# Patient Record
Sex: FEMALE | Race: WHITE | NOT HISPANIC OR LATINO | Employment: FULL TIME | URBAN - METROPOLITAN AREA
[De-identification: names, ages, dates, MRNs, and addresses within clinical notes are randomized per-mention and may not be internally consistent; named-entity substitution may affect disease eponyms.]

---

## 2023-02-27 PROBLEM — U09.9 POST COVID-19 CONDITION, UNSPECIFIED: Status: ACTIVE | Noted: 2023-02-27

## 2023-02-27 PROBLEM — G93.32 COVID-19 LONG HAULER MANIFESTING CHRONIC FATIGUE: Status: ACTIVE | Noted: 2023-02-27

## 2023-02-27 PROBLEM — U09.9 COVID-19 LONG HAULER MANIFESTING CHRONIC FATIGUE: Status: ACTIVE | Noted: 2023-02-27

## 2023-02-27 PROBLEM — J45.909 AIRWAY HYPERREACTIVITY (HHS-HCC): Status: ACTIVE | Noted: 2023-02-27

## 2023-02-27 PROBLEM — H92.09 OTALGIA: Status: ACTIVE | Noted: 2023-02-27

## 2023-02-27 PROBLEM — U07.1 COVID-19: Status: ACTIVE | Noted: 2023-02-27

## 2023-02-27 PROBLEM — R63.5 WEIGHT GAIN: Status: ACTIVE | Noted: 2023-02-27

## 2023-02-27 PROBLEM — L50.9 URTICARIA, UNSPECIFIED: Status: ACTIVE | Noted: 2023-02-27

## 2023-02-27 PROBLEM — F41.9 ANXIETY: Status: ACTIVE | Noted: 2023-02-27

## 2023-02-27 PROBLEM — G43.909 MIGRAINE WITHOUT STATUS MIGRAINOSUS, NOT INTRACTABLE: Status: ACTIVE | Noted: 2023-02-27

## 2023-02-27 PROBLEM — J32.9 SINUSITIS: Status: ACTIVE | Noted: 2023-02-27

## 2023-02-27 RX ORDER — DOXYCYCLINE 100 MG/1
1 TABLET ORAL EVERY 12 HOURS
COMMUNITY
End: 2023-10-23 | Stop reason: ALTCHOICE

## 2023-02-27 RX ORDER — ALBUTEROL SULFATE 90 UG/1
1-2 AEROSOL, METERED RESPIRATORY (INHALATION)
COMMUNITY
Start: 2018-12-17

## 2023-02-27 RX ORDER — CITALOPRAM 20 MG/1
1 TABLET, FILM COATED ORAL DAILY
COMMUNITY
Start: 2022-07-11 | End: 2023-04-07 | Stop reason: ALTCHOICE

## 2023-03-07 PROBLEM — Z00.00 ROUTINE HEALTH MAINTENANCE: Status: ACTIVE | Noted: 2023-03-07

## 2023-03-08 ENCOUNTER — OFFICE VISIT (OUTPATIENT)
Dept: PRIMARY CARE | Facility: CLINIC | Age: 23
End: 2023-03-08
Payer: COMMERCIAL

## 2023-03-08 ENCOUNTER — LAB (OUTPATIENT)
Dept: LAB | Facility: LAB | Age: 23
End: 2023-03-08
Payer: COMMERCIAL

## 2023-03-08 VITALS
SYSTOLIC BLOOD PRESSURE: 121 MMHG | HEART RATE: 64 BPM | WEIGHT: 194.6 LBS | OXYGEN SATURATION: 99 % | DIASTOLIC BLOOD PRESSURE: 79 MMHG | BODY MASS INDEX: 29.59 KG/M2 | TEMPERATURE: 97.3 F

## 2023-03-08 DIAGNOSIS — R63.5 WEIGHT GAIN: Primary | ICD-10-CM

## 2023-03-08 DIAGNOSIS — R63.5 WEIGHT GAIN: ICD-10-CM

## 2023-03-08 DIAGNOSIS — F41.9 ANXIETY: ICD-10-CM

## 2023-03-08 PROBLEM — H92.09 OTALGIA: Status: RESOLVED | Noted: 2023-02-27 | Resolved: 2023-03-08

## 2023-03-08 LAB — THYROTROPIN (MIU/L) IN SER/PLAS BY DETECTION LIMIT <= 0.05 MIU/L: 1.14 MIU/L (ref 0.44–3.98)

## 2023-03-08 PROCEDURE — 1036F TOBACCO NON-USER: CPT | Performed by: NURSE PRACTITIONER

## 2023-03-08 PROCEDURE — 36415 COLL VENOUS BLD VENIPUNCTURE: CPT

## 2023-03-08 PROCEDURE — 84443 ASSAY THYROID STIM HORMONE: CPT

## 2023-03-08 PROCEDURE — 99214 OFFICE O/P EST MOD 30 MIN: CPT | Performed by: NURSE PRACTITIONER

## 2023-03-08 RX ORDER — BUSPIRONE HYDROCHLORIDE 5 MG/1
5 TABLET ORAL 2 TIMES DAILY
Status: DISCONTINUED | OUTPATIENT
Start: 2023-03-08 | End: 2023-03-13

## 2023-03-08 NOTE — ASSESSMENT & PLAN NOTE
Reviewed PCP and endo notes  Reviewed 10/2022 labs in [facility]  - recheck elevated TSH, NL FT4  IUD removed Dec 2022  Will try switching celexa to buspar   - tells me no depression hx  Monitor closely with 30 day fu

## 2023-03-08 NOTE — PROGRESS NOTES
Subjective   Patient ID: Jolanta Andrews is a 22 y.o. female who presents for weight gain.  HPI  Has seen PCP for this  Endo also   - dietician saw her at this visit  - got no further suggestions     B: avacado toast on ezek bread, two eggs and chicken sausage  L: salad with chicken  D: ranch chicken with broccoli  S: yogurt, apple, protein shake    IUD removed in Dec  Last 3 months 4-5 days later than usual       Reviewed 10/22 labs found in [facility]  Also reviewed Dr Davis and bree previous notes      Review of Systems   All other systems reviewed and are negative.        Objective   Physical Exam  Constitutional:       General: She is not in acute distress.  HENT:      Head: Normocephalic and atraumatic.      Right Ear: Tympanic membrane and external ear normal.      Left Ear: Tympanic membrane and external ear normal.      Nose: Nose normal.      Mouth/Throat:      Mouth: Mucous membranes are moist.   Eyes:      Extraocular Movements: Extraocular movements intact.      Conjunctiva/sclera: Conjunctivae normal.   Cardiovascular:      Rate and Rhythm: Normal rate and regular rhythm.      Pulses: Normal pulses.   Pulmonary:      Effort: Pulmonary effort is normal.      Breath sounds: Normal breath sounds.   Abdominal:      General: Bowel sounds are normal.      Palpations: Abdomen is soft.   Musculoskeletal:         General: Normal range of motion.      Cervical back: Normal range of motion and neck supple.   Skin:     General: Skin is warm and dry.   Neurological:      General: No focal deficit present.      Mental Status: She is alert.   Psychiatric:         Mood and Affect: Mood normal.         Assessment/Plan   Problem List Items Addressed This Visit          Endocrine/Metabolic    Weight gain - Primary    Relevant Orders    TSH with reflex to Free T4 if abnormal       Infectious/Inflammatory     Reviewed PCP and endo notes  Reviewed 10/2022 labs in [facility]  - recheck elevated TSH, NL FT4  IUD removed Dec  2022  Will try switching celexa to buspar   - tells me no depression hx  Monitor closely with 30 day fu             Other    Anxiety     Try switching celexa to buspar given persistent weight gain wo identified underlying cause   30 day fu can be VV with NP  Discussed risks/benefits of buspar         Relevant Medications    busPIRone (Buspar) tablet 5 mg

## 2023-03-08 NOTE — ASSESSMENT & PLAN NOTE
Try switching celexa to buspar given persistent weight gain wo identified underlying cause   30 day fu can be VV with NP  Discussed risks/benefits of buspar

## 2023-03-13 DIAGNOSIS — F41.9 ANXIETY: Primary | ICD-10-CM

## 2023-03-13 RX ORDER — BUSPIRONE HYDROCHLORIDE 5 MG/1
5 TABLET ORAL 2 TIMES DAILY
Qty: 60 TABLET | Refills: 3 | Status: CANCELLED | OUTPATIENT
Start: 2023-03-13 | End: 2023-04-12

## 2023-03-13 RX ORDER — BUSPIRONE HYDROCHLORIDE 5 MG/1
5 TABLET ORAL 2 TIMES DAILY
Qty: 60 TABLET | Refills: 3 | Status: SHIPPED | OUTPATIENT
Start: 2023-03-13 | End: 2023-04-06

## 2023-03-21 ENCOUNTER — TELEPHONE (OUTPATIENT)
Dept: PRIMARY CARE | Facility: CLINIC | Age: 23
End: 2023-03-21
Payer: COMMERCIAL

## 2023-03-21 NOTE — TELEPHONE ENCOUNTER
Jolanta called today she thinks she is having reaction to the Buspar 5 mg for the last week she has been having headaches, nausea and dizziness  Asking should she continue or is there something else to try

## 2023-03-21 NOTE — TELEPHONE ENCOUNTER
Could she try decreasing the dose to 2.5mg daily for few days then 2.5mg BID for few days then see how she feels  Hard to know if sx are from coming off celexa or from the new med  Yes there are other options but this one has the least amount of weight gain assoc with it

## 2023-03-22 NOTE — TELEPHONE ENCOUNTER
Relayed message to pt take 2.5 mg daily for a few dayy then 2.5 mg 2 times a day for a few days and see how you feel hard to tell if it is from coming off the celexa or the new med

## 2023-04-04 DIAGNOSIS — F41.9 ANXIETY: ICD-10-CM

## 2023-04-06 RX ORDER — BUSPIRONE HYDROCHLORIDE 5 MG/1
TABLET ORAL
Qty: 180 TABLET | Refills: 2 | Status: SHIPPED | OUTPATIENT
Start: 2023-04-06 | End: 2023-10-23 | Stop reason: ALTCHOICE

## 2023-04-07 ENCOUNTER — TELEMEDICINE (OUTPATIENT)
Dept: PRIMARY CARE | Facility: CLINIC | Age: 23
End: 2023-04-07
Payer: COMMERCIAL

## 2023-04-07 ENCOUNTER — APPOINTMENT (OUTPATIENT)
Dept: PRIMARY CARE | Facility: CLINIC | Age: 23
End: 2023-04-07
Payer: COMMERCIAL

## 2023-04-07 DIAGNOSIS — F41.9 ANXIETY: Primary | ICD-10-CM

## 2023-04-07 PROCEDURE — 99213 OFFICE O/P EST LOW 20 MIN: CPT | Performed by: NURSE PRACTITIONER

## 2023-04-07 ASSESSMENT — ANXIETY QUESTIONNAIRES
3. WORRYING TOO MUCH ABOUT DIFFERENT THINGS: NOT AT ALL
7. FEELING AFRAID AS IF SOMETHING AWFUL MIGHT HAPPEN: NOT AT ALL
2. NOT BEING ABLE TO STOP OR CONTROL WORRYING: NOT AT ALL
GAD7 TOTAL SCORE: 3
6. BECOMING EASILY ANNOYED OR IRRITABLE: SEVERAL DAYS
1. FEELING NERVOUS, ANXIOUS, OR ON EDGE: NOT AT ALL
5. BEING SO RESTLESS THAT IT IS HARD TO SIT STILL: SEVERAL DAYS
4. TROUBLE RELAXING: SEVERAL DAYS
IF YOU CHECKED OFF ANY PROBLEMS ON THIS QUESTIONNAIRE, HOW DIFFICULT HAVE THESE PROBLEMS MADE IT FOR YOU TO DO YOUR WORK, TAKE CARE OF THINGS AT HOME, OR GET ALONG WITH OTHER PEOPLE: NOT DIFFICULT AT ALL

## 2023-04-07 NOTE — PROGRESS NOTES
"An interactive audio and video telecommunication system which permits real time communications between the patient (at the originating site) and provider (at the distant site) was utilized to provide this telehealth service.  Verbal consent was requested and obtained from the patient for this telehealth visit.       Subjective   Patient ID: Jolanta Andrews is a 22 y.o. female who presents for Follow-up (30 day Follow up visit/Starting to adjust to the anxiety medication now).  HPI  Needed to slowly taper up to current 5 mg BID  Random small HA  - doesn't last long  Feeling stable at this dose       Review of Systems   All other systems reviewed and are negative.      BP Readings from Last 3 Encounters:   03/08/23 121/79   12/07/22 118/70   10/21/22 119/68      Wt Readings from Last 3 Encounters:   03/08/23 88.3 kg (194 lb 9.6 oz)   12/07/22 86.3 kg (190 lb 4 oz)   10/21/22 85.3 kg (188 lb)      BMI:   Estimated body mass index is 29.59 kg/m² as calculated from the following:    Height as of 12/7/22: 1.727 m (5' 8\").    Weight as of 3/8/23: 88.3 kg (194 lb 9.6 oz).    Objective   Physical Exam  Gen: Alert, NAD  Respiratory:  resp effort NL  Neuro:  coordination intact   Mood: normal        Assessment/Plan   Problem List Items Addressed This Visit       Anxiety - Primary     Doing well on buspar 5 mg BID  GAD7=3  Continue same dose & monitor            "

## 2023-08-21 ENCOUNTER — TELEPHONE (OUTPATIENT)
Dept: PRIMARY CARE | Facility: CLINIC | Age: 23
End: 2023-08-21
Payer: COMMERCIAL

## 2023-08-21 NOTE — TELEPHONE ENCOUNTER
----- Message from Angelina Davis MD sent at 8/21/2023 10:22 AM EDT -----  Thank you for all the details   Very helpful  Options   10/23 1130   Or  10/30 0730  ----- Message -----  From: Rhoda Trevino  Sent: 8/21/2023  10:16 AM EDT  To: Angelina Davis MD    Patient called and stated she needed to reschedule her CPE to any day on a Monday, she is currently scheduled for 10/27. I looked at your schedule and you don't have any Mondays open that I could reschedule her to. I let patient know I would reach out to you and see what you advise.     Thank You!

## 2023-09-23 PROBLEM — Z97.5 IUD (INTRAUTERINE DEVICE) IN PLACE: Status: ACTIVE | Noted: 2023-09-23

## 2023-09-23 PROBLEM — J32.9 SINUSITIS: Status: RESOLVED | Noted: 2023-02-27 | Resolved: 2023-09-23

## 2023-09-23 PROBLEM — U07.1 COVID-19: Status: RESOLVED | Noted: 2023-02-27 | Resolved: 2023-09-23

## 2023-10-21 ASSESSMENT — PROMIS GLOBAL HEALTH SCALE
RATE_AVERAGE_PAIN: 2
RATE_AVERAGE_FATIGUE: MODERATE
CARRYOUT_SOCIAL_ACTIVITIES: VERY GOOD
RATE_PHYSICAL_HEALTH: VERY GOOD
RATE_QUALITY_OF_LIFE: VERY GOOD
RATE_SOCIAL_SATISFACTION: VERY GOOD
CARRYOUT_PHYSICAL_ACTIVITIES: COMPLETELY
RATE_GENERAL_HEALTH: VERY GOOD
EMOTIONAL_PROBLEMS: RARELY
RATE_MENTAL_HEALTH: GOOD

## 2023-10-23 ENCOUNTER — OFFICE VISIT (OUTPATIENT)
Dept: PRIMARY CARE | Facility: CLINIC | Age: 23
End: 2023-10-23
Payer: COMMERCIAL

## 2023-10-23 VITALS
HEART RATE: 62 BPM | BODY MASS INDEX: 29.1 KG/M2 | WEIGHT: 192 LBS | TEMPERATURE: 98.1 F | SYSTOLIC BLOOD PRESSURE: 112 MMHG | HEIGHT: 68 IN | DIASTOLIC BLOOD PRESSURE: 76 MMHG | OXYGEN SATURATION: 99 %

## 2023-10-23 DIAGNOSIS — E55.9 VITAMIN D INSUFFICIENCY: ICD-10-CM

## 2023-10-23 DIAGNOSIS — Z00.00 ROUTINE HEALTH MAINTENANCE: Primary | ICD-10-CM

## 2023-10-23 DIAGNOSIS — F41.9 ANXIETY: ICD-10-CM

## 2023-10-23 PROBLEM — Z97.5 IUD (INTRAUTERINE DEVICE) IN PLACE: Status: RESOLVED | Noted: 2023-09-23 | Resolved: 2023-10-23

## 2023-10-23 PROBLEM — U09.9 POST COVID-19 CONDITION, UNSPECIFIED: Status: RESOLVED | Noted: 2023-02-27 | Resolved: 2023-10-23

## 2023-10-23 PROBLEM — R63.5 WEIGHT GAIN: Status: RESOLVED | Noted: 2023-02-27 | Resolved: 2023-10-23

## 2023-10-23 PROBLEM — L50.9 URTICARIA, UNSPECIFIED: Status: RESOLVED | Noted: 2023-02-27 | Resolved: 2023-10-23

## 2023-10-23 PROCEDURE — 90686 IIV4 VACC NO PRSV 0.5 ML IM: CPT | Performed by: INTERNAL MEDICINE

## 2023-10-23 PROCEDURE — 90471 IMMUNIZATION ADMIN: CPT | Performed by: INTERNAL MEDICINE

## 2023-10-23 PROCEDURE — 3008F BODY MASS INDEX DOCD: CPT | Performed by: INTERNAL MEDICINE

## 2023-10-23 PROCEDURE — 99395 PREV VISIT EST AGE 18-39: CPT | Performed by: INTERNAL MEDICINE

## 2023-10-23 PROCEDURE — 1036F TOBACCO NON-USER: CPT | Performed by: INTERNAL MEDICINE

## 2023-10-23 RX ORDER — CHOLECALCIFEROL (VITAMIN D3) 25 MCG
1000 TABLET ORAL DAILY
COMMUNITY
Start: 2021-09-10

## 2023-10-23 RX ORDER — FLUTICASONE PROPIONATE 110 UG/1
1 AEROSOL, METERED RESPIRATORY (INHALATION) 2 TIMES DAILY PRN
COMMUNITY
Start: 2022-05-09

## 2023-10-23 RX ORDER — AZELASTINE 1 MG/ML
1-2 SPRAY, METERED NASAL AS NEEDED
COMMUNITY
Start: 2023-01-10

## 2023-10-23 ASSESSMENT — PATIENT HEALTH QUESTIONNAIRE - PHQ9
SUM OF ALL RESPONSES TO PHQ9 QUESTIONS 1 AND 2: 0
1. LITTLE INTEREST OR PLEASURE IN DOING THINGS: NOT AT ALL
2. FEELING DOWN, DEPRESSED OR HOPELESS: NOT AT ALL

## 2023-10-23 NOTE — PROGRESS NOTES
ANNUAL CPE VISIT      HPI:annual CPE  Still has some palpitations when exercising in afternoon, not in AM  Feels it is better than it was  Manages it fine    Changed Celexa to Buspar  Was getting HA  Weaned off in May and doing well    Weight still bothers her  Works as a   High protein diet  Works out near daily    Assessment and Plan:  Problem List Items Addressed This Visit          High    Routine health maintenance - Primary    Overview     Influenza 9/21/22  Moderna Vaccine 3/6/21, 4/3/21  DTaP (Age<7)10/19/2001, 2000, 2000  TDAP 11/19/18   Hepatitis B Peds/Adol2000, 2000, 2000   Hib - 4 Dose Fmgjfasx28/19/2001, 2000, 2000, . . .   IPV10/19/2001, 2000, 2000   MMR8/21/2001   PPD (Mantoux)5/14/2001   Varicella Vaccine5/14/2001  Influenza 9/24/19, 10/21/21  HPV 11/19/18, 1/4/19, 12/3/20  Vit D 36 in 2020  PAP 2/15/22 (-)  GC/Chlamydia 9/10/21 (-)         Current Assessment & Plan     Annual Wellness exam completed   Preventive Health History reviewed  Ordered:   Labs             Relevant Orders    Flu vaccine (IIV4) age 6 months and greater, preservative free (Completed)    Urinalysis with Reflex Microscopic    Comprehensive Metabolic Panel    CBC and Auto Differential    Lipid Panel       Medium    Anxiety    Overview     Sees therapist (Bobbi) and [facility].   Celexa - weight gain  Buspar- Has  Off meds and doing OK  Monitor         Relevant Orders    TSH with reflex to Free T4 if abnormal    BMI 29.0-29.9,adult    Overview     Works as a          Current Assessment & Plan     Try IF on the day she doesn't exercise         Relevant Orders    TSH with reflex to Free T4 if abnormal    FSH & LH    Cortisol    Vitamin D insufficiency    Overview     2021: 36  Not c/w supplement         Relevant Orders    Vitamin D 25-Hydroxy,Total (for eval of Vitamin D levels)     ROS otherwise negative aside from what was mentioned above in  "HPI.    Vitals  /76   Pulse 62   Temp 36.7 °C (98.1 °F)   Ht 1.715 m (5' 7.5\")   Wt 87.1 kg (192 lb)   SpO2 99%   BMI 29.63 kg/m²   Body mass index is 29.63 kg/m².  Physical Exam  Gen: Alert, NAD  HEENT:  Normal exam  Neck:  No masses/nodes palpable; Thyroid nontender and without nodules; No PABLO  Respiratory:  Lungs CTAB  CV: RRR  Neuro:  Gross motor and sensory intact  Skin:  No suspicious lesions present  Breast: No masses or axillary lymphadenopathy      Active Problem List  Patient Active Problem List   Diagnosis    Hyperactive airway disease    Anxiety    Migraine without status migrainosus, not intractable, unspecified migraine type    Routine health maintenance    BMI 29.0-29.9,adult    Vitamin D insufficiency       Comprehensive Medical/Surgical/Social/Family History  Past Medical History:   Diagnosis Date    H/O chest x-ray     5/10/22: normal     1/19: normal     11/21:Normal    H/O echocardiogram     3/21: 1. The left ventricular systolic function is normal with a 60% estimated ejection     fraction.     2. There is mitral stenosis is not seen.     3. Aortic valve stenosis is not present.     4. The main pulmonary artery is normal in size, and position, with normal bifurcation into     the left and right pulmonary arteries.    H/O pelvic ultrasound     6/15/22: IMPRESSION:     1. IUD in good position. 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 also, " [employer] for Social Work     Nonsmoker    Social ETOH    ---    Family History:    M:    F:    S: Anxiety and Depression, ?Bipolar    B: ADHD    GF: HTN    GM: HTN    MGrAunt: thyroid    PGF: Throat CA       Allergies and Medications  Doxycycline, Oxaprozin, Penicillins, and Sulfamethoxazole-trimethoprim  Current Outpatient Medications   Medication Instructions    albuterol 90 mcg/actuation inhaler 1-2 puffs, inhalation, EVERY 4 TO 6 HOURS AS NEEDED    azelastine (Astelin) 137 mcg (0.1 %) nasal spray 1-2 sprays, Each Nostril, As needed    cholecalciferol (VITAMIN D-3) 1,000 Units, oral, Daily    fluticasone (Flovent HFA) 110 mcg/actuation inhaler 1 puff, inhalation, 2 times daily PRN

## 2023-10-27 ENCOUNTER — APPOINTMENT (OUTPATIENT)
Dept: PRIMARY CARE | Facility: CLINIC | Age: 23
End: 2023-10-27
Payer: COMMERCIAL

## 2023-11-06 ENCOUNTER — LAB (OUTPATIENT)
Dept: LAB | Facility: LAB | Age: 23
End: 2023-11-06
Payer: COMMERCIAL

## 2023-11-06 DIAGNOSIS — Z00.00 ROUTINE HEALTH MAINTENANCE: ICD-10-CM

## 2023-11-06 DIAGNOSIS — F41.9 ANXIETY: ICD-10-CM

## 2023-11-06 DIAGNOSIS — R79.89 ELEVATED CORTISOL LEVEL: Primary | ICD-10-CM

## 2023-11-06 DIAGNOSIS — E55.9 VITAMIN D INSUFFICIENCY: ICD-10-CM

## 2023-11-06 LAB
25(OH)D3 SERPL-MCNC: 34 NG/ML (ref 30–100)
ALBUMIN SERPL BCP-MCNC: 4.3 G/DL (ref 3.4–5)
ALP SERPL-CCNC: 69 U/L (ref 33–110)
ALT SERPL W P-5'-P-CCNC: 12 U/L (ref 7–45)
ANION GAP SERPL CALC-SCNC: 14 MMOL/L (ref 10–20)
APPEARANCE UR: CLEAR
AST SERPL W P-5'-P-CCNC: 18 U/L (ref 9–39)
BACTERIA #/AREA URNS AUTO: ABNORMAL /HPF
BASOPHILS # BLD AUTO: 0.04 X10*3/UL (ref 0–0.1)
BASOPHILS NFR BLD AUTO: 0.8 %
BILIRUB SERPL-MCNC: 0.4 MG/DL (ref 0–1.2)
BILIRUB UR STRIP.AUTO-MCNC: NEGATIVE MG/DL
BUN SERPL-MCNC: 15 MG/DL (ref 6–23)
CALCIUM SERPL-MCNC: 9.3 MG/DL (ref 8.6–10.3)
CHLORIDE SERPL-SCNC: 106 MMOL/L (ref 98–107)
CHOLEST SERPL-MCNC: 130 MG/DL (ref 0–199)
CHOLESTEROL/HDL RATIO: 2.5
CO2 SERPL-SCNC: 26 MMOL/L (ref 21–32)
COLOR UR: YELLOW
CORTIS SERPL-MCNC: 20.7 UG/DL (ref 2.5–20)
CREAT SERPL-MCNC: 1.12 MG/DL (ref 0.5–1.05)
EOSINOPHIL # BLD AUTO: 0.03 X10*3/UL (ref 0–0.7)
EOSINOPHIL NFR BLD AUTO: 0.6 %
ERYTHROCYTE [DISTWIDTH] IN BLOOD BY AUTOMATED COUNT: 13.2 % (ref 11.5–14.5)
FSH SERPL-ACNC: 7.8 IU/L
GFR SERPL CREATININE-BSD FRML MDRD: 71 ML/MIN/1.73M*2
GLUCOSE SERPL-MCNC: 81 MG/DL (ref 74–99)
GLUCOSE UR STRIP.AUTO-MCNC: NEGATIVE MG/DL
HCT VFR BLD AUTO: 39.3 % (ref 36–46)
HDLC SERPL-MCNC: 51.6 MG/DL
HGB BLD-MCNC: 12.6 G/DL (ref 12–16)
HYALINE CASTS #/AREA URNS AUTO: ABNORMAL /LPF
IMM GRANULOCYTES # BLD AUTO: 0.01 X10*3/UL (ref 0–0.7)
IMM GRANULOCYTES NFR BLD AUTO: 0.2 % (ref 0–0.9)
KETONES UR STRIP.AUTO-MCNC: NEGATIVE MG/DL
LDLC SERPL CALC-MCNC: 66 MG/DL
LEUKOCYTE ESTERASE UR QL STRIP.AUTO: NEGATIVE
LH SERPL-ACNC: 4.9 IU/L
LYMPHOCYTES # BLD AUTO: 1.42 X10*3/UL (ref 1.2–4.8)
LYMPHOCYTES NFR BLD AUTO: 30.1 %
MCH RBC QN AUTO: 30.1 PG (ref 26–34)
MCHC RBC AUTO-ENTMCNC: 32.1 G/DL (ref 32–36)
MCV RBC AUTO: 94 FL (ref 80–100)
MONOCYTES # BLD AUTO: 0.37 X10*3/UL (ref 0.1–1)
MONOCYTES NFR BLD AUTO: 7.8 %
MUCOUS THREADS #/AREA URNS AUTO: ABNORMAL /LPF
NEUTROPHILS # BLD AUTO: 2.85 X10*3/UL (ref 1.2–7.7)
NEUTROPHILS NFR BLD AUTO: 60.5 %
NITRITE UR QL STRIP.AUTO: NEGATIVE
NON HDL CHOLESTEROL: 78 MG/DL (ref 0–149)
NRBC BLD-RTO: 0 /100 WBCS (ref 0–0)
PH UR STRIP.AUTO: 5 [PH]
PLATELET # BLD AUTO: 240 X10*3/UL (ref 150–450)
POTASSIUM SERPL-SCNC: 4.1 MMOL/L (ref 3.5–5.3)
PROT SERPL-MCNC: 7 G/DL (ref 6.4–8.2)
PROT UR STRIP.AUTO-MCNC: NEGATIVE MG/DL
RBC # BLD AUTO: 4.18 X10*6/UL (ref 4–5.2)
RBC # UR STRIP.AUTO: ABNORMAL /UL
RBC #/AREA URNS AUTO: ABNORMAL /HPF
SODIUM SERPL-SCNC: 142 MMOL/L (ref 136–145)
SP GR UR STRIP.AUTO: 1.02
SQUAMOUS #/AREA URNS AUTO: ABNORMAL /HPF
TRIGL SERPL-MCNC: 60 MG/DL (ref 0–149)
TSH SERPL-ACNC: 1.81 MIU/L (ref 0.44–3.98)
UROBILINOGEN UR STRIP.AUTO-MCNC: <2 MG/DL
VLDL: 12 MG/DL (ref 0–40)
WBC # BLD AUTO: 4.7 X10*3/UL (ref 4.4–11.3)
WBC #/AREA URNS AUTO: ABNORMAL /HPF

## 2023-11-06 PROCEDURE — 82306 VITAMIN D 25 HYDROXY: CPT

## 2023-11-06 PROCEDURE — 81001 URINALYSIS AUTO W/SCOPE: CPT

## 2023-11-06 PROCEDURE — 82533 TOTAL CORTISOL: CPT

## 2023-11-06 PROCEDURE — 36415 COLL VENOUS BLD VENIPUNCTURE: CPT

## 2023-11-06 PROCEDURE — 80061 LIPID PANEL: CPT

## 2023-11-06 PROCEDURE — 83001 ASSAY OF GONADOTROPIN (FSH): CPT

## 2023-11-06 PROCEDURE — 80053 COMPREHEN METABOLIC PANEL: CPT

## 2023-11-06 PROCEDURE — 83002 ASSAY OF GONADOTROPIN (LH): CPT

## 2023-11-06 PROCEDURE — 85025 COMPLETE CBC W/AUTO DIFF WBC: CPT

## 2023-11-06 PROCEDURE — 84443 ASSAY THYROID STIM HORMONE: CPT

## 2023-11-07 RX ORDER — DEXAMETHASONE 1 MG/1
1 TABLET ORAL ONCE
Qty: 1 TABLET | Refills: 0 | Status: SHIPPED | OUTPATIENT
Start: 2023-11-07 | End: 2023-12-26

## 2023-11-10 ENCOUNTER — LAB (OUTPATIENT)
Dept: LAB | Facility: LAB | Age: 23
End: 2023-11-10
Payer: COMMERCIAL

## 2023-11-10 DIAGNOSIS — R79.89 ELEVATED CORTISOL LEVEL: ICD-10-CM

## 2023-11-10 LAB — CORTIS SERPL-MCNC: 0.8 UG/DL (ref 2.5–20)

## 2023-11-10 PROCEDURE — 36415 COLL VENOUS BLD VENIPUNCTURE: CPT

## 2023-11-10 PROCEDURE — 80375 DRUG/SUBSTANCE NOS 1-3: CPT

## 2023-11-10 PROCEDURE — 82533 TOTAL CORTISOL: CPT

## 2023-11-10 PROCEDURE — 82024 ASSAY OF ACTH: CPT

## 2023-11-12 LAB — ACTH PLAS-MCNC: 3.7 PG/ML (ref 7.2–63.3)

## 2023-11-18 LAB — DEXAMETHASONE SERPL-MCNC: 289.1 NG/DL

## 2023-12-26 ENCOUNTER — OFFICE VISIT (OUTPATIENT)
Dept: PRIMARY CARE | Facility: CLINIC | Age: 23
End: 2023-12-26
Payer: COMMERCIAL

## 2023-12-26 VITALS
SYSTOLIC BLOOD PRESSURE: 119 MMHG | OXYGEN SATURATION: 99 % | HEART RATE: 90 BPM | DIASTOLIC BLOOD PRESSURE: 81 MMHG | TEMPERATURE: 99 F | WEIGHT: 189 LBS | BODY MASS INDEX: 29.16 KG/M2

## 2023-12-26 DIAGNOSIS — R09.89 SYMPTOMS OF UPPER RESPIRATORY INFECTION (URI): Primary | ICD-10-CM

## 2023-12-26 DIAGNOSIS — J45.20 MILD INTERMITTENT ASTHMA, UNSPECIFIED WHETHER COMPLICATED (HHS-HCC): ICD-10-CM

## 2023-12-26 LAB — POC RAPID STREP: NEGATIVE

## 2023-12-26 PROCEDURE — 87637 SARSCOV2&INF A&B&RSV AMP PRB: CPT

## 2023-12-26 PROCEDURE — 87880 STREP A ASSAY W/OPTIC: CPT | Performed by: INTERNAL MEDICINE

## 2023-12-26 PROCEDURE — 99214 OFFICE O/P EST MOD 30 MIN: CPT | Performed by: INTERNAL MEDICINE

## 2023-12-26 PROCEDURE — 1036F TOBACCO NON-USER: CPT | Performed by: INTERNAL MEDICINE

## 2023-12-26 PROCEDURE — 3008F BODY MASS INDEX DOCD: CPT | Performed by: INTERNAL MEDICINE

## 2023-12-26 RX ORDER — AZITHROMYCIN 250 MG/1
TABLET, FILM COATED ORAL
Qty: 6 TABLET | Refills: 0 | Status: SHIPPED | OUTPATIENT
Start: 2023-12-26 | End: 2023-12-31

## 2023-12-26 RX ORDER — OSELTAMIVIR PHOSPHATE 75 MG/1
75 CAPSULE ORAL 2 TIMES DAILY
Qty: 10 CAPSULE | Refills: 0 | Status: SHIPPED | OUTPATIENT
Start: 2023-12-26 | End: 2023-12-31

## 2023-12-26 ASSESSMENT — PATIENT HEALTH QUESTIONNAIRE - PHQ9
2. FEELING DOWN, DEPRESSED OR HOPELESS: NOT AT ALL
1. LITTLE INTEREST OR PLEASURE IN DOING THINGS: NOT AT ALL
SUM OF ALL RESPONSES TO PHQ9 QUESTIONS 1 AND 2: 0

## 2023-12-26 NOTE — PROGRESS NOTES
Chief Complaint   Patient presents with    Cough     Sore throat ,cough,runny nose,headaches, body aches, fever 100, chills x 2  days (Liu Charmaine in evening)  Felt fine yesterday morning oddly but then Sx returned last night and this morning   At home covid test was negative   Otc dayquil and motrin  Did get flu vaccine         ASSESSMENT/PLAN  Problem List Items Addressed This Visit          Medium    Hyperactive airway disease    Overview     normal PFTS  no response to Singulair or Albuterol  On Flovent and Astelin prn          Other Visit Diagnoses       Symptoms of upper respiratory infection (URI)    -  Primary    Will r/o possible causes  Rapid Strep (-)  Flu seems most likely  Will start Tamiflu, await Flu swab results  Zpack if sx persist and viral swabs (-)    Relevant Medications    oseltamivir (Tamiflu) 75 mg capsule    azithromycin (Zithromax) 250 mg tablet    Other Relevant Orders    Sars-CoV-2 PCR, Symptomatic    POCT Rapid Strep A manually resulted    RSV PCR    Influenza A, and B PCR              ALLERGIES  Doxycycline, Oxaprozin, Penicillins, and Sulfamethoxazole-trimethoprim    MEDICATIONS  Current Outpatient Medications   Medication Instructions    albuterol 90 mcg/actuation inhaler 1-2 puffs, inhalation, EVERY 4 TO 6 HOURS AS NEEDED    azelastine (Astelin) 137 mcg (0.1 %) nasal spray 1-2 sprays, Each Nostril, As needed    azithromycin (Zithromax) 250 mg tablet Take 2 tablets (500 mg) by mouth once daily for 1 day, THEN 1 tablet (250 mg) once daily for 4 days. Take 2 tabs (500 mg) by mouth today, than 1 daily for 4 days..    cholecalciferol (VITAMIN D-3) 1,000 Units, oral, Daily    dexAMETHasone (DECADRON) 1 mg, oral, Once, Take at 11pm the night before the cortisol lab test    fluticasone (Flovent HFA) 110 mcg/actuation inhaler 1 puff, inhalation, 2 times daily PRN    oseltamivir (TAMIFLU) 75 mg, oral, 2 times daily        ROS otherwise negative aside from what was mentioned above in  HPI.    PHYSICAL EXAM  /81   Pulse 90   Temp 37.2 °C (99 °F) (Temporal)   Wt 85.7 kg (189 lb)   SpO2 99%   BMI 29.16 kg/m²   Body mass index is 29.16 kg/m².  Gen: Alert, NAD  HEENT:  PERRLA, EOMI, conjunctiva and sclera normal in appearance; Throat mildly erytehmatous, no exudate  No PABLO  Respiratory:  Lungs CTAB  Cardiovascular:  Heart RRR. No M/R/G  Neuro:  Gross motor and sensory intact  Skin:  No suspicious lesions present

## 2023-12-27 LAB
FLUAV RNA RESP QL NAA+PROBE: NOT DETECTED
FLUBV RNA RESP QL NAA+PROBE: DETECTED
RSV RNA RESP QL NAA+PROBE: NOT DETECTED
SARS-COV-2 RNA RESP QL NAA+PROBE: NOT DETECTED

## 2024-11-26 ENCOUNTER — APPOINTMENT (OUTPATIENT)
Dept: PRIMARY CARE | Facility: CLINIC | Age: 24
End: 2024-11-26
Payer: COMMERCIAL

## 2025-02-17 ENCOUNTER — APPOINTMENT (OUTPATIENT)
Dept: PRIMARY CARE | Facility: CLINIC | Age: 25
End: 2025-02-17
Payer: COMMERCIAL

## 2025-02-17 VITALS
TEMPERATURE: 98.4 F | BODY MASS INDEX: 28.85 KG/M2 | SYSTOLIC BLOOD PRESSURE: 105 MMHG | HEIGHT: 68 IN | OXYGEN SATURATION: 97 % | WEIGHT: 190.38 LBS | HEART RATE: 70 BPM | DIASTOLIC BLOOD PRESSURE: 67 MMHG

## 2025-02-17 DIAGNOSIS — J30.9 ALLERGIC RHINITIS, UNSPECIFIED SEASONALITY, UNSPECIFIED TRIGGER: ICD-10-CM

## 2025-02-17 DIAGNOSIS — J45.20 MILD INTERMITTENT ASTHMA, UNSPECIFIED WHETHER COMPLICATED (HHS-HCC): ICD-10-CM

## 2025-02-17 DIAGNOSIS — Z00.00 ROUTINE HEALTH MAINTENANCE: Primary | ICD-10-CM

## 2025-02-17 DIAGNOSIS — Z12.4 SCREENING FOR CERVICAL CANCER: ICD-10-CM

## 2025-02-17 DIAGNOSIS — E55.9 VITAMIN D INSUFFICIENCY: ICD-10-CM

## 2025-02-17 PROCEDURE — 1036F TOBACCO NON-USER: CPT | Performed by: INTERNAL MEDICINE

## 2025-02-17 PROCEDURE — 99395 PREV VISIT EST AGE 18-39: CPT | Performed by: INTERNAL MEDICINE

## 2025-02-17 PROCEDURE — 3008F BODY MASS INDEX DOCD: CPT | Performed by: INTERNAL MEDICINE

## 2025-02-17 RX ORDER — AZELASTINE 1 MG/ML
1-2 SPRAY, METERED NASAL AS NEEDED
Qty: 30 ML | Refills: 3 | Status: SHIPPED | OUTPATIENT
Start: 2025-02-17

## 2025-02-17 RX ORDER — ALBUTEROL SULFATE 90 UG/1
1-2 INHALANT RESPIRATORY (INHALATION) EVERY 4 HOURS PRN
Qty: 18 G | Refills: 3 | Status: SHIPPED | OUTPATIENT
Start: 2025-02-17

## 2025-02-17 RX ORDER — BUSPIRONE HYDROCHLORIDE 5 MG/1
5 TABLET ORAL 2 TIMES DAILY
COMMUNITY
Start: 2023-04-06 | End: 2025-02-17 | Stop reason: WASHOUT

## 2025-02-17 ASSESSMENT — PROMIS GLOBAL HEALTH SCALE
CARRYOUT_PHYSICAL_ACTIVITIES: COMPLETELY
RATE_MENTAL_HEALTH: VERY GOOD
RATE_GENERAL_HEALTH: VERY GOOD
RATE_PHYSICAL_HEALTH: VERY GOOD
RATE_AVERAGE_PAIN: 0
EMOTIONAL_PROBLEMS: NEVER
RATE_QUALITY_OF_LIFE: EXCELLENT
CARRYOUT_SOCIAL_ACTIVITIES: EXCELLENT
RATE_SOCIAL_SATISFACTION: VERY GOOD
RATE_AVERAGE_FATIGUE: MILD

## 2025-02-17 NOTE — ASSESSMENT & PLAN NOTE
Annual Wellness exam completed   Preventive Health History reviewed  Ordered:   Labs     PAP done

## 2025-02-17 NOTE — PROGRESS NOTES
ANNUAL CPE VISIT  Chief Complaint   Patient presents with    Annual Exam     HPI: Reviewed chart/medical care received since last seen by me.    Got  last year  No new issues  Due for PAP      Assessment and Plan:  Problem List Items Addressed This Visit          High    Routine health maintenance - Primary    Overview     Influenza 9/21/22, 10/23/23, 10/1/24  Moderna Vaccine 3/6/21, 4/3/21  DTaP (Age<7)10/19/2001, 2000, 2000  TDAP 11/19/18   Hepatitis B Peds/Adol2000, 2000, 2000   Hib - 4 Dose [place]/19/2001, 2000, 2000, . . .   IPV10/19/2001, 2000, 2000   MMR8/21/2001   PPD (Mantoux)5/14/2001   Varicella Vaccine5/14/2001  Influenza 9/24/19, 10/21/21  HPV 11/19/18, 1/4/19, 12/3/20  Vit D 36 in 2020  PAP 2/15/22 (-)  GC/Chlamydia 9/10/21 (-)         Current Assessment & Plan     Annual Wellness exam completed   Preventive Health History reviewed  Ordered:   Labs     PAP done           Relevant Medications    prenatal vitamin, iron-folic, 27 mg iron-800 mcg folic acid tablet    Other Relevant Orders    Referral to Gynecology    TSH with reflex to Free T4 if abnormal    Comprehensive Metabolic Panel    CBC and Auto Differential    Lipid Panel    Urinalysis with Reflex Microscopic       Medium    Allergic rhinitis    Relevant Medications    azelastine (Astelin) 137 mcg (0.1 %) nasal spray    Hyperactive airway disease ([facility])    Overview     normal PFTS  no response to Singulair or Albuterol  On Flovent in past  On Astelin prn         Relevant Medications    albuterol 90 mcg/actuation inhaler    Screening for cervical cancer    Relevant Orders    THINPREP PAP TEST    Vitamin D insufficiency    Overview     2021: 36  Not c/w supplement         Relevant Orders    Vitamin D 25-Hydroxy,Total (for eval of Vitamin D levels)     ROS otherwise negative aside from what was mentioned above in HPI.    Vitals  /67   Pulse 70   Temp 36.9 °C (98.4 °F)   Ht 1.721 m  "(5' 7.75\")   Wt 86.4 kg (190 lb 6 oz)   SpO2 97%   BMI 29.16 kg/m²   Body mass index is 29.16 kg/m².  Physical Exam  Gen: Alert, NAD  HEENT:  Normal exam  Neck:  No masses/nodes palpable; Thyroid nontender and without nodules; No PABLO  Respiratory:  Lungs CTAB  CV: RRR  Neuro:  Gross motor and sensory intact  Skin:  No suspicious lesions present  Breast: No masses or axillary lymphadenopathy  Gyn: Normal pelvic exam: no uterine masses or cervical lesions, or CMT; no vaginal D/C. No ovarian or adnexal masses; No external vaginal or anal/perineal lesions (Pt declined chaperone)    Allergies and Medications  Oxaprozin, Penicillins, and Sulfamethoxazole-trimethoprim  Current Outpatient Medications   Medication Instructions    albuterol 90 mcg/actuation inhaler 1-2 puffs, inhalation, Every 4 hours PRN, EVERY 4 TO 6 HOURS AS NEEDED    azelastine (Astelin) 137 mcg (0.1 %) nasal spray 1-2 sprays, Each Nostril, As needed    cholecalciferol (VITAMIN D-3) 1,000 Units, Daily    prenatal vitamin, iron-folic, 27 mg iron-800 mcg folic acid tablet 1 tablet, oral, Daily       Active Problem List  Patient Active Problem List   Diagnosis    Hyperactive airway disease ([facility])    Anxiety    Migraine without status migrainosus, not intractable, unspecified migraine type    Routine health maintenance    BMI 29.0-29.9,adult    Vitamin D insufficiency    Allergic rhinitis    Screening for cervical cancer       Comprehensive Medical/Surgical/Social/Family History  Past Medical History:   Diagnosis Date    H/O chest x-ray     5/10/22: normal     1/19: normal     11/21:Normal    H/O echocardiogram     3/21: 1. The left ventricular systolic function is normal with a 60% estimated ejection     fraction.     2. There is mitral stenosis is not seen.     3. Aortic valve stenosis is not present.     4. The main pulmonary artery is normal in size, and position, with normal bifurcation into     the left and right pulmonary arteries.    H/O pelvic " ultrasound     6/15/22: IMPRESSION:     1. IUD in good position. Physiologic free fluid on the right from     presumed right ovarian cyst or dominant follicle    H/O x-ray of lower extremity     9/18: possible stress fx ankle    History of PFTs     3/21: normal     Past Surgical History:   Procedure Laterality Date    INTRAUTERINE DEVICE INSERTION  10/21/2022    10/15/21: Mirena, removed in 2022    OTHER SURGICAL HISTORY  11/19/2018    Tooth extraction    OTHER SURGICAL HISTORY  11/25/2019    [place] tooth extraction       Social History     Social History Narrative    Social History:    ,  no kids    Social Work Degree     and [employer] (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

## 2025-02-25 LAB
25(OH)D3+25(OH)D2 SERPL-MCNC: 38 NG/ML (ref 30–100)
ALBUMIN SERPL-MCNC: 4.3 G/DL (ref 3.6–5.1)
ALP SERPL-CCNC: 52 U/L (ref 31–125)
ALT SERPL-CCNC: 20 U/L (ref 6–29)
ANION GAP SERPL CALCULATED.4IONS-SCNC: 6 MMOL/L (CALC) (ref 7–17)
APPEARANCE UR: CLEAR
AST SERPL-CCNC: 17 U/L (ref 10–30)
BASOPHILS # BLD AUTO: 22 CELLS/UL (ref 0–200)
BASOPHILS NFR BLD AUTO: 0.4 %
BILIRUB SERPL-MCNC: 0.4 MG/DL (ref 0.2–1.2)
BILIRUB UR QL STRIP: NEGATIVE
BUN SERPL-MCNC: 17 MG/DL (ref 7–25)
CALCIUM SERPL-MCNC: 9.3 MG/DL (ref 8.6–10.2)
CHLORIDE SERPL-SCNC: 106 MMOL/L (ref 98–110)
CHOLEST SERPL-MCNC: 114 MG/DL
CHOLEST/HDLC SERPL: 2.2 (CALC)
CO2 SERPL-SCNC: 27 MMOL/L (ref 20–32)
COLOR UR: YELLOW
CREAT SERPL-MCNC: 0.93 MG/DL (ref 0.5–0.96)
EGFRCR SERPLBLD CKD-EPI 2021: 88 ML/MIN/1.73M2
EOSINOPHIL # BLD AUTO: 39 CELLS/UL (ref 15–500)
EOSINOPHIL NFR BLD AUTO: 0.7 %
ERYTHROCYTE [DISTWIDTH] IN BLOOD BY AUTOMATED COUNT: 12.2 % (ref 11–15)
GLUCOSE SERPL-MCNC: 78 MG/DL (ref 65–99)
GLUCOSE UR QL STRIP: NEGATIVE
HCT VFR BLD AUTO: 38.6 % (ref 35–45)
HDLC SERPL-MCNC: 51 MG/DL
HGB BLD-MCNC: 12.5 G/DL (ref 11.7–15.5)
HGB UR QL STRIP: NEGATIVE
KETONES UR QL STRIP: NEGATIVE
LDLC SERPL CALC-MCNC: 49 MG/DL (CALC)
LEUKOCYTE ESTERASE UR QL STRIP: NEGATIVE
LYMPHOCYTES # BLD AUTO: 1557 CELLS/UL (ref 850–3900)
LYMPHOCYTES NFR BLD AUTO: 28.3 %
MCH RBC QN AUTO: 30.6 PG (ref 27–33)
MCHC RBC AUTO-ENTMCNC: 32.4 G/DL (ref 32–36)
MCV RBC AUTO: 94.4 FL (ref 80–100)
MONOCYTES # BLD AUTO: 429 CELLS/UL (ref 200–950)
MONOCYTES NFR BLD AUTO: 7.8 %
NEUTROPHILS # BLD AUTO: 3454 CELLS/UL (ref 1500–7800)
NEUTROPHILS NFR BLD AUTO: 62.8 %
NITRITE UR QL STRIP: NEGATIVE
NONHDLC SERPL-MCNC: 63 MG/DL (CALC)
PH UR STRIP: 6.5 [PH] (ref 5–8)
PLATELET # BLD AUTO: 188 THOUSAND/UL (ref 140–400)
PMV BLD REES-ECKER: 11.8 FL (ref 7.5–12.5)
POTASSIUM SERPL-SCNC: 4.2 MMOL/L (ref 3.5–5.3)
PROT SERPL-MCNC: 6.6 G/DL (ref 6.1–8.1)
PROT UR QL STRIP: NEGATIVE
RBC # BLD AUTO: 4.09 MILLION/UL (ref 3.8–5.1)
SODIUM SERPL-SCNC: 139 MMOL/L (ref 135–146)
SP GR UR STRIP: 1.02 (ref 1–1.03)
TRIGL SERPL-MCNC: 65 MG/DL
TSH SERPL-ACNC: 2.14 MIU/L
WBC # BLD AUTO: 5.5 THOUSAND/UL (ref 3.8–10.8)

## 2025-03-04 LAB
CYTOLOGY CMNT CVX/VAG CYTO-IMP: NORMAL
LAB AP HPV GENOTYPE QUESTION: YES
LAB AP HPV HR: NORMAL
LABORATORY COMMENT REPORT: NORMAL
PATH REPORT.TOTAL CANCER: NORMAL